# Patient Record
Sex: MALE | Race: BLACK OR AFRICAN AMERICAN | Employment: FULL TIME | URBAN - METROPOLITAN AREA
[De-identification: names, ages, dates, MRNs, and addresses within clinical notes are randomized per-mention and may not be internally consistent; named-entity substitution may affect disease eponyms.]

---

## 2017-10-26 ENCOUNTER — OFFICE VISIT (OUTPATIENT)
Dept: CARDIOLOGY CLINIC | Age: 37
End: 2017-10-26

## 2017-10-26 VITALS
HEIGHT: 72 IN | DIASTOLIC BLOOD PRESSURE: 80 MMHG | HEART RATE: 80 BPM | SYSTOLIC BLOOD PRESSURE: 118 MMHG | WEIGHT: 280 LBS | BODY MASS INDEX: 37.93 KG/M2 | OXYGEN SATURATION: 97 %

## 2017-10-26 DIAGNOSIS — I42.9 CARDIOMYOPATHY, UNSPECIFIED TYPE (HCC): Primary | ICD-10-CM

## 2017-10-26 DIAGNOSIS — R00.0 TACHYCARDIA: ICD-10-CM

## 2017-10-26 RX ORDER — METOPROLOL SUCCINATE 50 MG/1
TABLET, EXTENDED RELEASE ORAL DAILY
COMMUNITY
End: 2017-11-03 | Stop reason: ALTCHOICE

## 2017-10-26 RX ORDER — BISMUTH SUBSALICYLATE 262 MG
1 TABLET,CHEWABLE ORAL DAILY
COMMUNITY

## 2017-10-26 NOTE — PROGRESS NOTES
1. Have you been to the ER, urgent care clinic since your last visit? Hospitalized since your last visit? No     2. Have you seen or consulted any other health care providers outside of the [address] [place] [address] since your last visit? Include any pap smears or colon screening.   No

## 2017-10-26 NOTE — PROGRESS NOTES
History of Present Illness:  A 40 y.o. male referred by Dr. Adan Farr for palpitations with heart rate of 150 beats per minute as well as to establish care for a history of hypertrophic cardiomyopathy. He was diagnosed with hypertrophic cardiomyopathy many years ago and recently relocated from [place]. He is starting a Hinduism in the area. Over this last weekend, he noted his heart rate increasing to 150 beats per minute which persisted for a few hours after just tying his shoes. He has lost significant weight over the last few weeks and has been taking some supplements for weight loss/energy. It is unclear what is in the supplement, however. He denies syncope. He has had some occasional chest pain from time to time. No PND, orthopnea, edema. He has two children who are healthy and have been screened for hypertrophic cardiomyopathy and have been normal. He has a history of hyperlipidemia as well. No recent echocardiogram or event monitor./    Impression/Plan:   History of hypertrophic cardiomyopathy by report. Recent palpitations with tachycardia up to 150 beats per minute. Dyslipidemia. BMI 37. Recent aggressive weight loss with supplements. He is in sinus rhythm today and doing well. He does not have any syncope. I will obtain the results from his last cardiologist in [facility]. I have asked him to hold off on using any supplements at this point. I will also obtain an echocardiogram and 30-day event monitor and see him back. I discussed the multitude of arrhythmias that could be associated with hypertrophic cardiomyopathy but at this point I need to identify rhythm. If this is unrevealing, I may consider him for a subcutaneous loop recorder. All questions answered. Past Medical History:   Diagnosis Date    Hypertrophic cardiomyopathy (HCC)        Current Outpatient Prescriptions   Medication Sig Dispense Refill    metoprolol succinate (TOPROL XL) 50 mg XL tablet Take  by mouth daily.       multivitamin (ONE A DAY) tablet Take 1 Tab by mouth daily.  B.infantis-B.ani-B.long-B.bifi (PROBIOTIC 4X) 10-15 mg TbEC Take  by mouth. Social History   reports that he has never smoked. He has never used smokeless tobacco.   reports that he does not drink alcohol. Family History  family history is not on file. Review of Systems  Except as stated above include:  Constitutional: Negative for fever, chills and malaise/fatigue. HEENT: No congestion or recent URI. Gastrointestinal: No nausea, vomiting, abdominal pain, bloody stools. Pulmonary:  Negative except as stated above. Cardiac:  Negative except as stated above. Musculoskeletal: Negative except as stated above. Neurological:  No localized symptoms. Skin:  Negative except as stated above. Psych:  No mood swings. Endocrine:  No heat/cold intolerance. PHYSICAL EXAM  BP Readings from Last 3 Encounters:   10/26/17 118/80     Pulse Readings from Last 3 Encounters:   10/26/17 80     Wt Readings from Last 3 Encounters:   10/26/17 127 kg (280 lb)     General:   Well developed, well groomed. Head/Neck:   No jugular venous distention     No carotid bruits. No evidence of xanthelasma. Lungs:   No respiratory distress. Clear bilaterally. Heart:    Regular rate and rhythm. Normal S1/S2. Palpation of heart with normal point of maximum impulse. No significant murmurs, rubs or gallops. Abdomen:   Soft and nontender. No palpable abdominal mass or bruits. Extremities:   Intact peripheral pulses. No significant edema. Neurological:   Alert and oriented to person, place, time. No focal neurological deficit visually. Blood Pressure Metric:  Carol White has been given the following recommendations today due to his elevated BP reading: anti-hypertensive pharmacologic therapy ordered.

## 2017-10-26 NOTE — MR AVS SNAPSHOT
Visit Information Date & Time Provider Department Dept. Phone Encounter #  
 10/26/2017 11:00 AM Isai Messer MD [facility] [address] [id number] Follow-up Instructions Follow-up and Disposition History Upcoming Health Maintenance Date Due DTaP/Tdap/Td series (1 - Tdap) 2/26/2001 INFLUENZA AGE 9 TO ADULT 8/1/2017 Allergies as of 10/26/2017  Review Complete On: 10/26/2017 By: Isai Messer MD  
 Not on File Current Immunizations  Never Reviewed No immunizations on file. Not reviewed this visit You Were Diagnosed With   
  
 Codes Comments Cardiomyopathy, unspecified type ([facility]    -  Primary ICD-10-CM: I42.9 ICD-9-CM: 425.4 Tachycardia     ICD-10-CM: R00.0 ICD-9-CM: 474. 0 Vitals BP Pulse Height(growth percentile) Weight(growth percentile) SpO2 BMI  
 118/80 80 6' (1.829 m) 280 lb (127 kg) 97% 37.97 kg/m2 Smoking Status Never Smoker Vitals History BMI and BSA Data Body Mass Index Body Surface Area  
 37.97 kg/m 2 2.54 m 2 Your Updated Medication List  
  
   
This list is accurate as of: 10/26/17 12:10 PM.  Always use your most recent med list.  
  
  
  
  
 multivitamin tablet Commonly known as:  ONE A DAY Take 1 Tab by mouth daily. PROBIOTIC 4X 10-15 mg Tbec Generic drug:  B.infantis-B.ani-B.long-B.bifi Take  by mouth. TOPROL XL 50 mg XL tablet Generic drug:  metoprolol succinate Take  by mouth daily. We Performed the Following AMB POC EKG ROUTINE W/ 12 LEADS, INTER & REP [22292 CPT(R)] ECG EVENT RECORD MONITORING SET-UP [id number] CPT(R)] To-Do List   
 10/26/2017 ECHO:  2D ECHO COMPLETE ADULT (TTE) W OR WO CONTR   
  
 11/10/2017 3:30 PM  
  Appointment with HBV- IE33 MACHINE (WT ) at [facility] NON-INVASIVE CARD ([phone]) Age Limit for ALL Heart procedures @ [facility] facilities: 18 yrs and older only. Under the age of 25, refer to [facility] (365-2009). Wt Limit: 350lbs. This study requires patient to bring a written physician's order or MD office may fax the order to Central Scheduling at [phone]. Patient needs to bring a current list of all medications. No preparation is required for this study. Patients should report 15 minutes prior to their appointment time to the [facility], [address] [place] [address]/[address]. Introducing [facility] & HEALTH SERVICES! Ela Cai introduces [employer] patient portal. Now you can access parts of your medical record, email your doctor's office, and request medication refills online. 1. In your internet browser, go to [email or web address] 2. Click on the First Time User? Click Here link in the Sign In box. You will see the New Member Sign Up page. 3. Enter your [employer] Access Code exactly as it appears below. You will not need to use this code after youve completed the sign-up process. If you do not sign up before the expiration date, you must request a new code. · [employer] Access Code: [email or web address] Expires: 1/24/2018 12:03 PM 
 
4. Enter the last four digits of your Social Security Number (xxxx) and Date of Birth (mm/dd/yyyy) as indicated and click Submit. You will be taken to the next sign-up page. 5. Create a [employer] ID. This will be your [employer] login ID and cannot be changed, so think of one that is secure and easy to remember. 6. Create a [employer] password. You can change your password at any time. 7. Enter your Password Reset Question and Answer. This can be used at a later time if you forget your password. 8. Enter your e-mail address. You will receive e-mail notification when new information is available in [address] 9. Click Sign Up. You can now view and download portions of your medical record. 10. Click the Download Summary menu link to download a portable copy of your medical information. If you have questions, please visit the Frequently Asked Questions section of the [employer] website. Remember, [employer] is NOT to be used for urgent needs. For medical emergencies, dial 911. Now available from your iPhone and Android! Please provide this summary of care documentation to your next provider. Your primary care clinician is listed as ANDREE STALLINGS. If you have any questions after today's visit, please call [phone].

## 2017-11-03 RX ORDER — METOPROLOL TARTRATE 25 MG/1
12.5 TABLET, FILM COATED ORAL 2 TIMES DAILY
Qty: 30 TAB | Refills: 6 | Status: SHIPPED | OUTPATIENT
Start: 2017-11-03 | End: 2018-02-01 | Stop reason: ALTCHOICE

## 2017-11-03 NOTE — TELEPHONE ENCOUNTER
Report received from [employer] this morning from patient's event recorder showing an episode of afib with RVR -5 beat run of VT occurring yesterday at 6:01 pm. Spoke with patient. He states he is aware of palpitations at that time with associated dizziness. He was playing the piano. He states the dizziness lasted a few seconds. Patient states he had taken his Toprol XL 50 mg the night prior at 2100. He does feel that his palpitations are becoming a bit more frequent.  I will make Dr. Raquel Morin aware

## 2017-11-03 NOTE — TELEPHONE ENCOUNTER
Euel Romberg, MD   to Me           11/3/17 10:54 AM   Noted. Gladys Garcia sure he is not taking any supplements and start on lopressor 12.5 mg bid.  continue to wear monitor, thx     Patient states he has stop taking the thermafit supplement. He verbalized understanding.

## 2017-11-10 ENCOUNTER — HOSPITAL ENCOUNTER (OUTPATIENT)
Dept: NON INVASIVE DIAGNOSTICS | Age: 37
Discharge: HOME | End: 2017-11-10
Attending: INTERNAL MEDICINE
Payer: COMMERCIAL

## 2017-11-10 DIAGNOSIS — R00.0 TACHYCARDIA: ICD-10-CM

## 2017-11-10 DIAGNOSIS — I42.9 CARDIOMYOPATHY, UNSPECIFIED TYPE (HCC): ICD-10-CM

## 2017-11-10 PROCEDURE — 93306 TTE W/DOPPLER COMPLETE: CPT

## 2017-11-17 ENCOUNTER — TELEPHONE (OUTPATIENT)
Dept: CARDIOLOGY CLINIC | Age: 37
End: 2017-11-17

## 2017-11-17 NOTE — TELEPHONE ENCOUNTER
Patient called to follow up on a message left from our office. I read the message left in CC . He said ok and then asked if he needed to continue wearing the monitor. I checked the date of set up and notified him he still had time before his 30 days was up. The patient verbalized that he understood and the call was ended.   Arcadio Herrera

## 2018-02-01 ENCOUNTER — OFFICE VISIT (OUTPATIENT)
Dept: CARDIOLOGY CLINIC | Age: 38
End: 2018-02-01

## 2018-02-01 VITALS
DIASTOLIC BLOOD PRESSURE: 72 MMHG | HEART RATE: 66 BPM | BODY MASS INDEX: 36.98 KG/M2 | SYSTOLIC BLOOD PRESSURE: 140 MMHG | OXYGEN SATURATION: 97 % | HEIGHT: 72 IN | WEIGHT: 273 LBS

## 2018-02-01 DIAGNOSIS — I42.9 CARDIOMYOPATHY, UNSPECIFIED TYPE (HCC): ICD-10-CM

## 2018-02-01 DIAGNOSIS — I48.0 PAROXYSMAL ATRIAL FIBRILLATION (HCC): ICD-10-CM

## 2018-02-01 DIAGNOSIS — R00.0 TACHYCARDIA: Primary | ICD-10-CM

## 2018-02-01 RX ORDER — METOPROLOL SUCCINATE 50 MG/1
50 TABLET, EXTENDED RELEASE ORAL DAILY
Qty: 30 TAB | Refills: 6 | Status: SHIPPED | OUTPATIENT
Start: 2018-02-01 | End: 2018-12-13 | Stop reason: SDUPTHER

## 2018-02-01 RX ORDER — ASPIRIN 81 MG/1
TABLET ORAL DAILY
COMMUNITY

## 2018-02-01 NOTE — MR AVS SNAPSHOT
[address] [address] [address] [address] [phone] Patient: Cong Palma MRN: [id number] ETN:[id number] Visit Information Date & Time Provider Department Dept. Phone Encounter #  
 2/1/2018  4:20 PM Thong Ring MD [facility] [address] [id number] Upcoming Health Maintenance Date Due Pneumococcal 19-64 Medium Risk (1 of 1 - PPSV23) 2/26/1999 DTaP/Tdap/Td series (1 - Tdap) 2/26/2001 Influenza Age 5 to Adult 8/1/2017 Allergies as of 2/1/2018  Review Complete On: 2/1/2018 By: Thong Ring MD  
 Not on File Current Immunizations  Never Reviewed No immunizations on file. Not reviewed this visit You Were Diagnosed With   
  
 Codes Comments Tachycardia    -  Primary ICD-10-CM: R00.0 ICD-9-CM: 785.0 Cardiomyopathy, unspecified type ([facility]     ICD-10-CM: I42.9 ICD-9-CM: 425.4 Vitals BP Pulse Height(growth percentile) Weight(growth percentile) SpO2 BMI  
 140/72 66 6' (1.829 m) 273 lb (123.8 kg) 97% 37.03 kg/m2 Smoking Status Never Smoker Vitals History BMI and BSA Data Body Mass Index Body Surface Area  
 37.03 kg/m 2 2.51 m 2 Preferred Pharmacy Pharmacy Name Phone [address] [place] [phone] Your Updated Medication List  
  
   
This list is accurate as of: 2/1/18  4:57 PM.  Always use your most recent med list.  
  
  
  
  
 aspirin delayed-release 81 mg tablet Take  by mouth daily. multivitamin tablet Commonly known as:  ONE A DAY Take 1 Tab by mouth daily. PROBIOTIC 4X 10-15 mg Tbec Generic drug:  B.infantis-B.ani-B.long-B.bifi Take  by mouth. Introducing [facility] & HEALTH SERVICES! Dear Jacob Mcmullen: 
Thank you for requesting a [employer] account.   Our records indicate that you already have an active [employer] account. You can access your account anytime at [email or web address] Did you know that you can access your hospital and ER discharge instructions at any time in [employer]? You can also review all of your test results from your hospital stay or ER visit. Additional Information If you have questions, please visit the Frequently Asked Questions section of the [employer] website at [email or web address] Remember, [employer] is NOT to be used for urgent needs. For medical emergencies, dial 911. Now available from your iPhone and Android! Please provide this summary of care documentation to your next provider. Your primary care clinician is listed as ANDREE STALLINGS. If you have any questions after today's visit, please call [phone].

## 2018-02-01 NOTE — PROGRESS NOTES
1. Have you been to the ER, urgent care clinic since your last visit? Hospitalized since your last visit? No     2. Have you seen or consulted any other health care providers outside of the [address] [place] [address] since your last visit? Include any pap smears or colon screening.  No

## 2018-02-01 NOTE — PROGRESS NOTES
History of Present Illness:  A 40 y.o. male here for follow up. He was initially referred by Dr. Calvin Guthrie for palpitations. He has a history of hypertrophic cardiomyopathy and recently relocated from [place]. 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 Unfortunately, this fell through and once his lease is up in June, he will go back to [place]. He had a cardiologist there. He was using some supplements for supplements for weight loss and energy and it is unclear what was in the supplement. He has never had any syncope. He had some occasional chest pain from time to time which resolved. No dyspnea, PND, orthopnea, edema. He has two healthy children who have been screened for hypertrophic cardiomyopathy. He has a history of dyslipidemia. I ordered an echocardiogram and a 30-day event monitor and he is here to discuss the results.      Impression/Plan:   Hypertrophic cardiomyopathy with echocardiogram showing septal thickness at 1.8 cm and normal with hyperdynamic function. There was no significant outflow obstruction. Recent palpitations with event monitor showing paroxysmal atrial flutter as well as atrial fibrillation and increased ventricular rate. He is taking low dose Lopressor and had previously taken Toprol 50 mg daily and done quite well. Dyslipidemia. BMI 37. Recent aggressive weight loss with supplements. He is in sinus rhythm today. I discussed the findings on his event monitor showing paroxysmal atrial fibrillation and flutter. They are infrequent and he has never had any syncope. I recommended increasing Toprol to 50 mg a day and starting a baby aspirin. I discussed the increased risk for stroke. I did discuss potential treatment options such as atrial flutter ablation and atrial fibrillation ablation. However at this time, he will be going back to [place] later this year, and I will leave it to his cardiologist there.  I also discussed the 1.8 cm septal thickness for his hypertrophic cardiomyopathy and he has not had any syncope or nonsustained VT. At this point, continue to monitor but I did discuss the potential need for an AICD. All questions answered. If he is still in town in six months, I will see him back. Otherwise, he will follow up with his cardiologist in [facility]. I have given him the results for his echocardiogram as well as event monitor to review. Past Medical History:   Diagnosis Date    Hypertrophic cardiomyopathy (HCC)        Current Outpatient Prescriptions   Medication Sig Dispense Refill    metoprolol tartrate (LOPRESSOR) 25 mg tablet Take 0.5 Tabs by mouth two (2) times a day. 30 Tab 6    multivitamin (ONE A DAY) tablet Take 1 Tab by mouth daily.  B.infantis-B.ani-B.long-B.bifi (PROBIOTIC 4X) 10-15 mg TbEC Take  by mouth. Social History   reports that he has never smoked. He has never used smokeless tobacco.   reports that he does not drink alcohol. Family History  family history is not on file. Review of Systems  Except as stated above include:  Constitutional: Negative for fever, chills and malaise/fatigue. HEENT: No congestion or recent URI. Gastrointestinal: No nausea, vomiting, abdominal pain, bloody stools. Pulmonary:  Negative except as stated above. Cardiac:  Negative except as stated above. Musculoskeletal: Negative except as stated above. Neurological:  No localized symptoms. Skin:  Negative except as stated above. Psych:  No mood swings. Endocrine:  No heat/cold intolerance. PHYSICAL EXAM  BP Readings from Last 3 Encounters:   02/01/18 140/72   10/26/17 118/80     Pulse Readings from Last 3 Encounters:   02/01/18 66   10/26/17 80     Wt Readings from Last 3 Encounters:   02/01/18 123.8 kg (273 lb)   10/26/17 127 kg (280 lb)     General:   Well developed, well groomed. Head/Neck:   No jugular venous distention     No carotid bruits. No evidence of xanthelasma. Lungs:   No respiratory distress. Clear bilaterally.   Heart:    Regular rate and rhythm. Normal S1/S2. Palpation of heart with normal point of maximum impulse. No significant murmurs, rubs or gallops. Abdomen:   Soft and nontender. No palpable abdominal mass or bruits. Extremities:   Intact peripheral pulses. No significant edema. Neurological:   Alert and oriented to person, place, time. No focal neurological deficit visually. Blood Pressure Metric:  Marly Monique has been given the following recommendations today due to his elevated BP reading: anti-hypertensive pharmacologic therapy ordered.

## 2018-08-21 ENCOUNTER — OFFICE VISIT (OUTPATIENT)
Dept: CARDIOLOGY CLINIC | Age: 38
End: 2018-08-21
Payer: COMMERCIAL

## 2018-08-21 VITALS
HEART RATE: 74 BPM | SYSTOLIC BLOOD PRESSURE: 112 MMHG | BODY MASS INDEX: 37.82 KG/M2 | DIASTOLIC BLOOD PRESSURE: 60 MMHG | RESPIRATION RATE: 16 BRPM | HEIGHT: 72 IN | WEIGHT: 279.2 LBS

## 2018-08-21 DIAGNOSIS — R07.9 CHEST PAIN, UNSPECIFIED TYPE: Primary | ICD-10-CM

## 2018-08-21 DIAGNOSIS — E78.01 FAMILIAL HYPERCHOLESTEROLEMIA: ICD-10-CM

## 2018-08-21 PROCEDURE — 93000 ELECTROCARDIOGRAM COMPLETE: CPT | Performed by: INTERNAL MEDICINE

## 2018-08-21 PROCEDURE — 99214 OFFICE O/P EST MOD 30 MIN: CPT | Performed by: INTERNAL MEDICINE

## 2018-10-29 ENCOUNTER — OFFICE VISIT (OUTPATIENT)
Dept: CARDIOLOGY CLINIC | Age: 38
End: 2018-10-29
Payer: COMMERCIAL

## 2018-10-29 VITALS
RESPIRATION RATE: 16 BRPM | WEIGHT: 279 LBS | HEIGHT: 72 IN | SYSTOLIC BLOOD PRESSURE: 110 MMHG | HEART RATE: 71 BPM | BODY MASS INDEX: 37.79 KG/M2 | DIASTOLIC BLOOD PRESSURE: 70 MMHG

## 2018-10-29 DIAGNOSIS — E78.01 FAMILIAL HYPERCHOLESTEROLEMIA: ICD-10-CM

## 2018-10-29 DIAGNOSIS — R07.9 CHEST PAIN, UNSPECIFIED TYPE: Primary | ICD-10-CM

## 2018-10-29 PROCEDURE — 99214 OFFICE O/P EST MOD 30 MIN: CPT | Performed by: INTERNAL MEDICINE

## 2018-10-29 PROCEDURE — 93000 ELECTROCARDIOGRAM COMPLETE: CPT | Performed by: INTERNAL MEDICINE

## 2018-10-29 NOTE — PROGRESS NOTES
MRI due to body habitus. No malignant arrhythmias noted on stress. Stable Holter 9/12. Continue BB and ASA. No high risk features found at this juncture. No outflow tract obstruction, arrhythmia, syncope, known family history of sudden cardiac death and LV thickness is < 3cm. Familial screening of his children recommended. 2. HTN: Observe. 3. Familial hyperlipidemia: Mixed lipid issues. Severely elevated triglycerides and cholesterol. Continue statin/niacin and restart fish oil. Consider PCSK9 if economically feasible. 4. PAF: Cardiologist in [place] suggested finding of PAF. CHADS VASc 1. No recurrence. Observe. Burak Mann D.O.   Cardiologist  Cardiology, [facility]

## 2018-12-13 RX ORDER — METOPROLOL SUCCINATE 50 MG/1
TABLET, EXTENDED RELEASE ORAL
Qty: 30 TAB | Refills: 6 | Status: SHIPPED | OUTPATIENT
Start: 2018-12-13 | End: 2019-07-16 | Stop reason: SDUPTHER

## 2019-07-16 RX ORDER — METOPROLOL SUCCINATE 50 MG/1
TABLET, EXTENDED RELEASE ORAL
Qty: 90 TAB | Refills: 3 | Status: SHIPPED | OUTPATIENT
Start: 2019-07-16

## 2019-09-10 ENCOUNTER — OFFICE VISIT (OUTPATIENT)
Dept: CARDIOLOGY CLINIC | Age: 39
End: 2019-09-10
Payer: COMMERCIAL

## 2019-09-10 VITALS
HEART RATE: 66 BPM | RESPIRATION RATE: 16 BRPM | SYSTOLIC BLOOD PRESSURE: 124 MMHG | BODY MASS INDEX: 40.44 KG/M2 | HEIGHT: 72 IN | WEIGHT: 298.6 LBS | DIASTOLIC BLOOD PRESSURE: 78 MMHG

## 2019-09-10 DIAGNOSIS — I42.2 HYPERTROPHIC CARDIOMYOPATHY (HCC): Primary | ICD-10-CM

## 2019-09-10 PROCEDURE — 93000 ELECTROCARDIOGRAM COMPLETE: CPT | Performed by: INTERNAL MEDICINE

## 2019-09-10 PROCEDURE — 99214 OFFICE O/P EST MOD 30 MIN: CPT | Performed by: INTERNAL MEDICINE

## 2019-09-10 RX ORDER — AZITHROMYCIN 250 MG/1
TABLET, FILM COATED ORAL
Refills: 0 | COMMUNITY
Start: 2019-09-08 | End: 2020-05-04

## 2019-09-10 RX ORDER — BENZONATATE 100 MG/1
CAPSULE ORAL
Refills: 0 | COMMUNITY
Start: 2019-09-08 | End: 2020-05-04

## 2019-09-10 RX ORDER — CEFDINIR 300 MG/1
CAPSULE ORAL
Refills: 0 | COMMUNITY
Start: 2019-09-08 | End: 2020-05-04

## 2020-02-25 RX ORDER — METOPROLOL SUCCINATE 50 MG/1
50 TABLET, EXTENDED RELEASE ORAL DAILY
Qty: 90 TABLET | Refills: 3 | Status: SHIPPED
Start: 2020-02-25 | End: 2020-07-31 | Stop reason: SDUPTHER

## 2020-04-28 ENCOUNTER — TELEPHONE (OUTPATIENT)
Dept: CARDIOLOGY CLINIC | Age: 40
End: 2020-04-28

## 2020-04-28 NOTE — TELEPHONE ENCOUNTER
Pt was calling for an annual appt with Dr Holli Angel. Pt stated that he has been experiencing more frequent episodes of his heart racing, episodes do not last more than a minute or so. He was  questioning if his metoprolol succinate 50 me daily might need adjusted. Pt would be willing to do a VV phone number was verified.  Please review and call pt with recommendations [phone]

## 2020-05-04 ENCOUNTER — VIRTUAL VISIT (OUTPATIENT)
Dept: CARDIOLOGY CLINIC | Age: 40
End: 2020-05-04
Payer: COMMERCIAL

## 2020-05-04 VITALS
HEART RATE: 62 BPM | BODY MASS INDEX: 41.58 KG/M2 | HEIGHT: 72 IN | DIASTOLIC BLOOD PRESSURE: 79 MMHG | WEIGHT: 307 LBS | SYSTOLIC BLOOD PRESSURE: 124 MMHG

## 2020-05-04 PROCEDURE — 99214 OFFICE O/P EST MOD 30 MIN: CPT | Performed by: INTERNAL MEDICINE

## 2020-05-04 RX ORDER — ATORVASTATIN CALCIUM 40 MG/1
40 TABLET, FILM COATED ORAL DAILY
COMMUNITY
Start: 2020-04-24

## 2020-05-04 NOTE — PROGRESS NOTES
CHIEF COMPLAINT: Palpitation, Chest pain and apical variant Hypertrophic Cardiomyopathy. HPI: Patient is a 36 y.o. male seen at the request of Reagan Torres MD.      Patient seen in follow up for a apical variant hypertrophic cardiomyopathy. No family history of SCD or HCM. Patient underwent cath that was stable 8/12. No CP or  SOB. Increased palpitations. Past Medical History:   Diagnosis Date    Abnormal EKG     Abnormal stress ECG     Chest pain     GRIJALVA (dyspnea on exertion)     Hyperlipidemia     Hypertension     LVH (left ventricular hypertrophy)        Patient Active Problem List   Diagnosis    Hyperlipidemia    LVH (left ventricular hypertrophy)    Abnormal EKG    Abnormal stress ECG    Chest pain    GRIJALVA (dyspnea on exertion)    Obesity    Hypertrophic cardiomyopathy (HCC)    Elevated LFTs       No Known Allergies    Current Outpatient Medications   Medication Sig Dispense Refill    atorvastatin (LIPITOR) 40 MG tablet       metoprolol succinate (TOPROL XL) 50 MG extended release tablet Take 1 tablet by mouth daily 90 tablet 3     No current facility-administered medications for this visit.         Social History     Socioeconomic History    Marital status:      Spouse name: Not on file    Number of children: Not on file    Years of education: Not on file    Highest education level: Not on file   Occupational History    Not on file   Social Needs    Financial resource strain: Not on file    Food insecurity     Worry: Not on file     Inability: Not on file    Transportation needs     Medical: Not on file     Non-medical: Not on file   Tobacco Use    Smoking status: Never Smoker    Smokeless tobacco: Never Used   Substance and Sexual Activity    Alcohol use: No    Drug use: No    Sexual activity: Not on file   Lifestyle    Physical activity     Days per week: Not on file     Minutes per session: Not on file    Stress: Not on file   Relationships    3 cm.    Familial screening of his children recommended. 2. HTN: Observe. 3. Familial hyperlipidemia: Consider restarting statin and fish oil. 4. PAF/Palpitations: Cardiologist in [place] suggested finding of PAF. CHADS VASc 1. No recurrence. Observe. Vilma De Jesus D.O. Cardiologist  Cardiology, [facility]) Physicians    Patient is being evaluated by a Virtual Visit (video visit) encounter to address concerns as mentioned above. A caregiver was present when appropriate. Due to this being a TeleHealth encounter (During FXM-54 public health emergency), evaluation of the following organ systems was limited: Vitals/Constitutional/EENT/Resp/CV/GI//MS/Neuro/Skin/Heme-Lymph-Imm. Pursuant to the emergency declaration under the [address] [place] [address], [address] [place] [address] authority and the [employer] and [employer] Act, this Virtual Visit was conducted with patient's (and/or legal guardian's) consent, to reduce the patient's risk of exposure to COVID-19 and provide necessary medical care. The patient (and/or legal guardian) has also been advised to contact this office for worsening conditions or problems, and seek emergency medical treatment and/or call 911 if deemed necessary. Services were provided through a video synchronous discussion virtually to substitute for in-person clinic visit. Patient and provider were located at their individual homes.

## 2020-07-15 ENCOUNTER — TELEPHONE (OUTPATIENT)
Dept: URGENT CARE | Facility: CLINIC | Age: 40
End: 2020-07-15

## 2020-07-31 RX ORDER — METOPROLOL SUCCINATE 50 MG/1
50 TABLET, EXTENDED RELEASE ORAL DAILY
Qty: 90 TABLET | Refills: 3 | Status: SHIPPED
Start: 2020-07-31 | End: 2020-11-02

## 2020-10-21 ENCOUNTER — NURSE ONLY (OUTPATIENT)
Dept: CARDIOLOGY CLINIC | Age: 40
End: 2020-10-21

## 2020-10-21 NOTE — PROGRESS NOTES
Patient was in today for 48 hr holter monitor per Dr. Christine Elliott. Patient was given instructions and questions answered, verbalize understanding.        Nancy Renteria MA

## 2020-10-29 ENCOUNTER — TELEPHONE (OUTPATIENT)
Dept: CARDIOLOGY CLINIC | Age: 40
End: 2020-10-29

## 2020-10-29 NOTE — TELEPHONE ENCOUNTER
From   Hilario Womack      Have you had a chance to look over my heart monitor readings? I feel like we might need to upgrade the dosage amount. I also noticed an increase when I ate chocolate. Lately, I have fell a muscle soreness near my left pectoral muscles and occasionally it switches over to the right pectoral muscles.

## 2020-11-02 ENCOUNTER — OFFICE VISIT (OUTPATIENT)
Dept: CARDIOLOGY CLINIC | Age: 40
End: 2020-11-02
Payer: COMMERCIAL

## 2020-11-02 ENCOUNTER — TELEPHONE (OUTPATIENT)
Dept: CARDIOLOGY CLINIC | Age: 40
End: 2020-11-02

## 2020-11-02 VITALS
WEIGHT: 310 LBS | DIASTOLIC BLOOD PRESSURE: 82 MMHG | RESPIRATION RATE: 16 BRPM | SYSTOLIC BLOOD PRESSURE: 122 MMHG | HEART RATE: 60 BPM | HEIGHT: 72 IN | BODY MASS INDEX: 41.99 KG/M2

## 2020-11-02 DIAGNOSIS — R00.2 PALPITATIONS: ICD-10-CM

## 2020-11-02 DIAGNOSIS — I48.0 PAF (PAROXYSMAL ATRIAL FIBRILLATION) (HCC): ICD-10-CM

## 2020-11-02 LAB
ALBUMIN SERPL-MCNC: 4.5 G/DL (ref 3.5–5.2)
ALP BLD-CCNC: 112 U/L (ref 40–129)
ALT SERPL-CCNC: 58 U/L (ref 0–40)
ANION GAP SERPL CALCULATED.3IONS-SCNC: 13 MMOL/L (ref 7–16)
AST SERPL-CCNC: 35 U/L (ref 0–39)
BILIRUB SERPL-MCNC: 0.4 MG/DL (ref 0–1.2)
BUN BLDV-MCNC: 15 MG/DL (ref 6–20)
CALCIUM SERPL-MCNC: 10.4 MG/DL (ref 8.6–10.2)
CHLORIDE BLD-SCNC: 103 MMOL/L (ref 98–107)
CO2: 26 MMOL/L (ref 22–29)
CREAT SERPL-MCNC: 0.9 MG/DL (ref 0.7–1.2)
GFR AFRICAN AMERICAN: >60
GFR NON-AFRICAN AMERICAN: >60 ML/MIN/1.73
GLUCOSE BLD-MCNC: 147 MG/DL (ref 74–99)
POTASSIUM SERPL-SCNC: 4.4 MMOL/L (ref 3.5–5)
SODIUM BLD-SCNC: 142 MMOL/L (ref 132–146)
TOTAL PROTEIN: 7.9 G/DL (ref 6.4–8.3)
TSH SERPL DL<=0.05 MIU/L-ACNC: 2.36 UIU/ML (ref 0.27–4.2)

## 2020-11-02 PROCEDURE — 93000 ELECTROCARDIOGRAM COMPLETE: CPT | Performed by: INTERNAL MEDICINE

## 2020-11-02 PROCEDURE — 99214 OFFICE O/P EST MOD 30 MIN: CPT | Performed by: INTERNAL MEDICINE

## 2020-11-02 RX ORDER — METOPROLOL SUCCINATE 50 MG/1
50 TABLET, EXTENDED RELEASE ORAL 2 TIMES DAILY
Qty: 180 TABLET | Refills: 3 | Status: SHIPPED
Start: 2020-11-02 | End: 2020-11-11 | Stop reason: ALTCHOICE

## 2020-11-02 RX ORDER — METOPROLOL SUCCINATE 50 MG/1
75 TABLET, EXTENDED RELEASE ORAL DAILY
Qty: 135 TABLET | Refills: 3 | Status: SHIPPED
Start: 2020-11-02 | End: 2020-11-02

## 2020-11-02 RX ORDER — ASPIRIN 81 MG/1
81 TABLET ORAL DAILY
Qty: 90 TABLET | Refills: 1 | Status: SHIPPED
Start: 2020-11-02 | End: 2020-11-02

## 2020-11-02 NOTE — PROGRESS NOTES
CHIEF COMPLAINT: Palpitations/PAF/Apical variant Hypertrophic Cardiomyopathy. HPI: Patient is a 36 y.o. male seen at the request of Shiloh Rendon MD.      Patient seen in follow up for a apical variant hypertrophic cardiomyopathy. He denies syncope, near-syncope or palpitations. No family history of SCD or HCM. Patient underwent cath that was stable 8/12. Moved to [address] [facility] and now has moved back. Frequent palpitations for last month. No CP or SOB. Past Medical History:   Diagnosis Date    Abnormal EKG     Abnormal stress ECG     Chest pain     GRIJALVA (dyspnea on exertion)     Hyperlipidemia     Hypertension     LVH (left ventricular hypertrophy)        Patient Active Problem List   Diagnosis    Hyperlipidemia    LVH (left ventricular hypertrophy)    Abnormal EKG    Abnormal stress ECG    Chest pain    GRIJALVA (dyspnea on exertion)    Obesity    Hypertrophic cardiomyopathy (HCC)    Elevated LFTs       No Known Allergies    Current Outpatient Medications   Medication Sig Dispense Refill    metoprolol succinate (TOPROL XL) 50 MG extended release tablet Take 1 tablet by mouth 2 times daily 180 tablet 3    rivaroxaban (XARELTO) 20 MG TABS tablet Take 1 tablet by mouth daily (with breakfast) 30 tablet 5    atorvastatin (LIPITOR) 40 MG tablet Take 40 mg by mouth daily        No current facility-administered medications for this visit.         Social History     Socioeconomic History    Marital status:      Spouse name: Not on file    Number of children: Not on file    Years of education: Not on file    Highest education level: Not on file   Occupational History    Not on file   Social Needs    Financial resource strain: Not on file    Food insecurity     Worry: Not on file     Inability: Not on file    Transportation needs     Medical: Not on file     Non-medical: Not on file   Tobacco Use    Smoking status: Never Smoker    Smokeless tobacco: Never Used   Substance and Sexual Activity    Alcohol use: No    Drug use: No    Sexual activity: Not on file   Lifestyle    Physical activity     Days per week: Not on file     Minutes per session: Not on file    Stress: Not on file   Relationships    Social connections     Talks on phone: Not on file     Gets together: Not on file     Attends Protestant service: Not on file     Active member of club or organization: Not on file     Attends meetings of clubs or organizations: Not on file     Relationship status: Not on file    Intimate partner violence     Fear of current or ex partner: Not on file     Emotionally abused: Not on file     Physically abused: Not on file     Forced sexual activity: Not on file   Other Topics Concern    Not on file   Social History Narrative    Not on file       Family History   Family history unknown: Yes     Review of Systems:  Heart: as above   Lungs: as above   Eyes: denies changes in vision or discharge. Ears: denies changes in hearing or pain. Nose: denies epistaxis or masses   Throat: denies sore throat or trouble swallowing. Neuro: denies numbness, tingling, tremors. Skin: denies rashes or itching. : denies hematuria, dysuria   GI: denies vomiting, diarrhea   Psych: denies mood changed, anxiety, depression. all others negative. Physical Exam   /82   Pulse 60   Resp 16   Ht 6' (1.829 m)   Wt (!) 310 lb (140.6 kg)   BMI 42.04 kg/m²   Constitutional: Oriented to person, place, and time. Well-developed and well-nourished. No distress. Head: Normocephalic and atraumatic. Eyes: EOM are normal. Pupils are equal, round, and reactive to light. Neck: Normal range of motion. Neck supple. No hepatojugular reflux and no JVD present. Carotid bruit is not present. No tracheal deviation present. No thyromegaly present. Cardiovascular: Normal rate, regular rhythm, normal heart sounds and intact distal pulses. Exam reveals no gallop and no friction rub.   No murmur heard.  Pulmonary/Chest: Effort normal and breath sounds normal. No respiratory distress. No wheezes. No rales. No tenderness. Abdominal: Soft. Bowel sounds are normal. No distension and no mass. No tenderness. No rebound and no guarding. Musculoskeletal: Normal range of motion. No edema and no tenderness. Lymphadenopathy:   No cervical adenopathy. Neurological: Alert and oriented to person, place, and time. Skin: Skin is warm and dry. No rash noted. Not diaphoretic. No erythema. Psychiatric: Normal mood and affect. Behavior is normal.     EKG:  normal sinus rhythm, nonspecific ST and T waves changes, LVH with significant strain pattern. ASSESSMENT AND PLAN:  Patient Active Problem List   Diagnosis    Hyperlipidemia    LVH (left ventricular hypertrophy)    Abnormal EKG    Abnormal stress ECG    Chest pain    GRIJALVA (dyspnea on exertion)    Obesity    Hypertrophic cardiomyopathy (HCC)    Elevated LFTs     1. GRIJALVA:    Findings by contrast echo suggesting apical variant hypertrophic cardiomyopathy. Stress with reversible perfusion defect. Cath normal 8/12. Unable to do cardiac MRI due to body habitus. No malignant arrhythmias noted on stress. Stable Holter 9/12. Continue BB and ASA. No high risk features found at this juncture. No outflow tract obstruction, arrhythmia, syncope, known family history of sudden cardiac death and LV thickness is < 3 cm. Familial screening of his children recommended. 2. HTN: Observe. 3. Familial hyperlipidemia: Consider restarting statin and fish oil. 4. Palpitations/PAF:     Cardiologist in [place] suggested finding of PAF. Recurrent. Echo. CMP/TSH. Increase toprol. CHADS VASc 1. NOAC for now. Cm Cline D.O.   Cardiologist  Cardiology, [facility]

## 2020-11-02 NOTE — TELEPHONE ENCOUNTER
----- Message from Donita Stevens DO sent at 11/2/2020  8:51 AM EST -----  Can we please request sleep study report from [facility]?     TY

## 2020-11-05 ENCOUNTER — TELEPHONE (OUTPATIENT)
Dept: CARDIOLOGY CLINIC | Age: 40
End: 2020-11-05

## 2020-11-10 ENCOUNTER — TELEPHONE (OUTPATIENT)
Dept: CARDIOLOGY CLINIC | Age: 40
End: 2020-11-10

## 2020-11-11 RX ORDER — METOPROLOL TARTRATE 50 MG/1
50 TABLET, FILM COATED ORAL 2 TIMES DAILY
COMMUNITY
End: 2020-11-11 | Stop reason: SDUPTHER

## 2020-11-12 RX ORDER — METOPROLOL TARTRATE 50 MG/1
50 TABLET, FILM COATED ORAL 2 TIMES DAILY
Qty: 60 TABLET | Refills: 5 | Status: SHIPPED
Start: 2020-11-12 | End: 2020-12-09

## 2020-11-18 ENCOUNTER — TELEPHONE (OUTPATIENT)
Dept: CARDIOLOGY CLINIC | Age: 40
End: 2020-11-18

## 2020-11-18 NOTE — TELEPHONE ENCOUNTER
Pt. Called last week stating that he would like to switch to metoprolol tartrate because it is fast acting, per  a script was sent in for 50mg BID. Pt. Called today stating that he has been taking TID because if he doesn't he gets palpitations at night and taking it twice a day doesn't cover him all day, pt.  Is scheduled for his echo on 11/23 and would like to know if ok to take TID, please advise

## 2020-11-18 NOTE — TELEPHONE ENCOUNTER
Okay to take tid. Please fix script if not done. Bakari Ann D.O.   Cardiologist  Cardiology, [facility]

## 2020-11-20 ENCOUNTER — TELEPHONE (OUTPATIENT)
Dept: CARDIOLOGY | Age: 40
End: 2020-11-20

## 2020-11-23 ENCOUNTER — HOSPITAL ENCOUNTER (OUTPATIENT)
Dept: CARDIOLOGY | Age: 40
Discharge: HOME | End: 2020-11-23
Payer: COMMERCIAL

## 2020-11-23 ENCOUNTER — TELEPHONE (OUTPATIENT)
Dept: CARDIOLOGY | Age: 40
End: 2020-11-23

## 2020-11-23 LAB
LV EF: 60 %
LVEF MODALITY: NORMAL

## 2020-11-23 PROCEDURE — 93306 TTE W/DOPPLER COMPLETE: CPT | Performed by: PSYCHIATRY & NEUROLOGY

## 2020-11-23 PROCEDURE — 6360000004 HC RX CONTRAST MEDICATION: Performed by: INTERNAL MEDICINE

## 2020-11-23 PROCEDURE — 2580000003 HC RX 258: Performed by: INTERNAL MEDICINE

## 2020-11-23 RX ORDER — SODIUM CHLORIDE 0.9 % (FLUSH) 0.9 %
10 SYRINGE (ML) INJECTION PRN
Status: DISCONTINUED | OUTPATIENT
Start: 2020-11-23 | End: 2020-11-24 | Stop reason: HOSPADM

## 2020-11-23 RX ADMIN — SODIUM CHLORIDE, PRESERVATIVE FREE 10 ML: 5 INJECTION INTRAVENOUS at 09:58

## 2020-11-23 RX ADMIN — PERFLUTREN 1.1 MG: 6.52 INJECTION, SUSPENSION INTRAVENOUS at 09:58

## 2020-11-23 RX ADMIN — SODIUM CHLORIDE, PRESERVATIVE FREE 10 ML: 5 INJECTION INTRAVENOUS at 10:04

## 2020-11-30 ENCOUNTER — TELEPHONE (OUTPATIENT)
Dept: CARDIOLOGY CLINIC | Age: 40
End: 2020-11-30

## 2020-11-30 NOTE — TELEPHONE ENCOUNTER
----- Message from Tram Mckeon DO sent at 11/30/2020  9:32 AM EST -----  No changes in echo since last.

## 2020-12-09 ENCOUNTER — OFFICE VISIT (OUTPATIENT)
Dept: CARDIOLOGY CLINIC | Age: 40
End: 2020-12-09
Payer: COMMERCIAL

## 2020-12-09 VITALS
BODY MASS INDEX: 42.66 KG/M2 | HEIGHT: 72 IN | SYSTOLIC BLOOD PRESSURE: 108 MMHG | RESPIRATION RATE: 18 BRPM | DIASTOLIC BLOOD PRESSURE: 62 MMHG | HEART RATE: 69 BPM | WEIGHT: 315 LBS

## 2020-12-09 PROCEDURE — 93000 ELECTROCARDIOGRAM COMPLETE: CPT | Performed by: INTERNAL MEDICINE

## 2020-12-09 PROCEDURE — 99214 OFFICE O/P EST MOD 30 MIN: CPT | Performed by: INTERNAL MEDICINE

## 2020-12-09 RX ORDER — METOPROLOL TARTRATE 50 MG/1
50 TABLET, FILM COATED ORAL 3 TIMES DAILY
Qty: 270 TABLET | Refills: 3 | Status: SHIPPED
Start: 2020-12-09 | End: 2022-01-28 | Stop reason: SDUPTHER

## 2020-12-09 NOTE — PROGRESS NOTES
CHIEF COMPLAINT: Palpitations/PAF/Apical variant Hypertrophic Cardiomyopathy. HPI: Patient is a 36 y.o. male seen at the request of Trenton Lima MD.      Patient seen in follow up for a apical variant hypertrophic cardiomyopathy. He denies syncope, near-syncope or palpitations. No family history of SCD or HCM. Patient underwent cath that was stable 8/12. Moved to [facility] and now has moved back. No CP or SOB. Some palpitations. Past Medical History:   Diagnosis Date    Abnormal EKG     Abnormal stress ECG     Chest pain     GRIJALVA (dyspnea on exertion)     Hyperlipidemia     Hypertension     LVH (left ventricular hypertrophy)        Patient Active Problem List   Diagnosis    Hyperlipidemia    LVH (left ventricular hypertrophy)    Abnormal EKG    Abnormal stress ECG    Chest pain    GRIJALVA (dyspnea on exertion)    Obesity    Hypertrophic cardiomyopathy (HCC)    Elevated LFTs       No Known Allergies    Current Outpatient Medications   Medication Sig Dispense Refill    rivaroxaban (XARELTO) 20 MG TABS tablet Take 1 tablet by mouth daily (with breakfast) 30 tablet 5    metoprolol tartrate (LOPRESSOR) 50 MG tablet Take 1 tablet by mouth 2 times daily (Patient taking differently: Take 50 mg by mouth 3 times daily ) 60 tablet 5    atorvastatin (LIPITOR) 40 MG tablet Take 40 mg by mouth daily        No current facility-administered medications for this visit.         Social History     Socioeconomic History    Marital status:      Spouse name: Not on file    Number of children: Not on file    Years of education: Not on file    Highest education level: Not on file   Occupational History    Not on file   Social Needs    Financial resource strain: Not on file    Food insecurity     Worry: Not on file     Inability: Not on file    Transportation needs     Medical: Not on file     Non-medical: Not on file   Tobacco Use    Smoking status: Never Smoker    Smokeless tobacco: Never Used   Substance and Sexual Activity    Alcohol use: No    Drug use: No    Sexual activity: Not on file   Lifestyle    Physical activity     Days per week: Not on file     Minutes per session: Not on file    Stress: Not on file   Relationships    Social connections     Talks on phone: Not on file     Gets together: Not on file     Attends Restorationism service: Not on file     Active member of club or organization: Not on file     Attends meetings of clubs or organizations: Not on file     Relationship status: Not on file    Intimate partner violence     Fear of current or ex partner: Not on file     Emotionally abused: Not on file     Physically abused: Not on file     Forced sexual activity: Not on file   Other Topics Concern    Not on file   Social History Narrative    Not on file       Family History   Family history unknown: Yes     Review of Systems:  Heart: as above   Lungs: as above   Eyes: denies changes in vision or discharge. Ears: denies changes in hearing or pain. Nose: denies epistaxis or masses   Throat: denies sore throat or trouble swallowing. Neuro: denies numbness, tingling, tremors. Skin: denies rashes or itching. : denies hematuria, dysuria   GI: denies vomiting, diarrhea   Psych: denies mood changed, anxiety, depression. all others negative. Physical Exam   /62   Pulse 69   Resp 18   Ht 6' (1.829 m)   Wt (!) 318 lb 9.6 oz (144.5 kg)   BMI 43.21 kg/m²   Constitutional: Oriented to person, place, and time. Well-developed and well-nourished. No distress. Head: Normocephalic and atraumatic. Eyes: EOM are normal. Pupils are equal, round, and reactive to light. Neck: Normal range of motion. Neck supple. No hepatojugular reflux and no JVD present. Carotid bruit is not present. No tracheal deviation present. No thyromegaly present. Cardiovascular: Normal rate, regular rhythm, normal heart sounds and intact distal pulses.   Exam reveals no gallop and no friction rub.  No murmur heard. Pulmonary/Chest: Effort normal and breath sounds normal. No respiratory distress. No wheezes. No rales. No tenderness. Abdominal: Soft. Bowel sounds are normal. No distension and no mass. No tenderness. No rebound and no guarding. Musculoskeletal: Normal range of motion. No edema and no tenderness. Lymphadenopathy:   No cervical adenopathy. Neurological: Alert and oriented to person, place, and time. Skin: Skin is warm and dry. No rash noted. Not diaphoretic. No erythema. Psychiatric: Normal mood and affect. Behavior is normal.     EKG:  normal sinus rhythm, nonspecific ST and T waves changes, LVH with significant strain pattern. ASSESSMENT AND PLAN:  Patient Active Problem List   Diagnosis    Hyperlipidemia    LVH (left ventricular hypertrophy)    Abnormal EKG    Abnormal stress ECG    Chest pain    GRIJALVA (dyspnea on exertion)    Obesity    Hypertrophic cardiomyopathy (HCC)    Elevated LFTs     1. GRIJALVA:    Findings by contrast echo suggesting apical variant hypertrophic cardiomyopathy. Stress with reversible perfusion defect. Cath normal 8/12. Unable to do cardiac MRI due to body habitus. No malignant arrhythmias noted on stress. Stable Holter 9/12. Continue BB and ASA. No high risk features found at this juncture. No outflow tract obstruction, arrhythmia, syncope, known family history of sudden cardiac death and LV thickness is < 3 cm. Familial screening of his children recommended. 2. HTN: Observe. 3. Familial hyperlipidemia: Consider restarting statin and fish oil. 4. Palpitations/PAF:     Cardiologist in [address] [facility] suggested finding of PAF. Recurrent. Echo unchanged from prior. CMP/TSH. Continue toprol. CHADS VASc 1. NOAC for now. Claude Saldana D.O.   Cardiologist  Cardiology, [facility]

## 2021-05-10 RX ORDER — ASPIRIN 81 MG/1
TABLET, COATED ORAL
Qty: 90 TABLET | Refills: 1 | Status: SHIPPED | OUTPATIENT
Start: 2021-05-10

## 2021-06-02 ENCOUNTER — HOSPITAL ENCOUNTER (OUTPATIENT)
Age: 41
Discharge: HOME | End: 2021-06-02
Payer: COMMERCIAL

## 2021-06-02 LAB
ALBUMIN SERPL-MCNC: 4.3 G/DL (ref 3.5–5.2)
ALP BLD-CCNC: 104 U/L (ref 40–129)
ALT SERPL-CCNC: 62 U/L (ref 0–40)
ANION GAP SERPL CALCULATED.3IONS-SCNC: 6 MMOL/L (ref 7–16)
AST SERPL-CCNC: 63 U/L (ref 0–39)
BILIRUB SERPL-MCNC: 0.4 MG/DL (ref 0–1.2)
BUN BLDV-MCNC: 14 MG/DL (ref 6–20)
CALCIUM SERPL-MCNC: 9.5 MG/DL (ref 8.6–10.2)
CHLORIDE BLD-SCNC: 99 MMOL/L (ref 98–107)
CHOLESTEROL, TOTAL: 381 MG/DL (ref 0–199)
CO2: 30 MMOL/L (ref 22–29)
CREAT SERPL-MCNC: 0.9 MG/DL (ref 0.7–1.2)
GFR AFRICAN AMERICAN: >60
GFR NON-AFRICAN AMERICAN: >60 ML/MIN/1.73
GLUCOSE BLD-MCNC: 163 MG/DL (ref 74–99)
HDLC SERPL-MCNC: 36 MG/DL
LDL CHOLESTEROL CALCULATED: ABNORMAL MG/DL (ref 0–99)
POTASSIUM SERPL-SCNC: 4.8 MMOL/L (ref 3.5–5)
SARS-COV-2 ANTIBODY, TOTAL: NORMAL
SODIUM BLD-SCNC: 135 MMOL/L (ref 132–146)
TOTAL PROTEIN: 7.8 G/DL (ref 6.4–8.3)
TRIGL SERPL-MCNC: 478 MG/DL (ref 0–149)
VLDLC SERPL CALC-MCNC: ABNORMAL MG/DL

## 2021-06-02 PROCEDURE — 84153 ASSAY OF PSA TOTAL: CPT

## 2021-06-02 PROCEDURE — 80061 LIPID PANEL: CPT

## 2021-06-02 PROCEDURE — 80053 COMPREHEN METABOLIC PANEL: CPT

## 2021-06-02 PROCEDURE — 86769 SARS-COV-2 COVID-19 ANTIBODY: CPT

## 2021-06-02 PROCEDURE — 36415 COLL VENOUS BLD VENIPUNCTURE: CPT

## 2021-06-04 ENCOUNTER — HOSPITAL ENCOUNTER (OUTPATIENT)
Age: 41
Discharge: HOME | End: 2021-06-04
Payer: COMMERCIAL

## 2021-06-04 LAB — HBA1C MFR BLD: 6.8 % (ref 4–5.6)

## 2021-06-04 PROCEDURE — 36415 COLL VENOUS BLD VENIPUNCTURE: CPT

## 2021-06-04 PROCEDURE — 83036 HEMOGLOBIN GLYCOSYLATED A1C: CPT

## 2021-06-06 LAB
Lab: NORMAL
REPORT: NORMAL
THIS TEST SENT TO: NORMAL

## 2022-01-28 ENCOUNTER — TELEPHONE (OUTPATIENT)
Dept: CARDIOLOGY CLINIC | Age: 42
End: 2022-01-28

## 2022-01-28 RX ORDER — METOPROLOL TARTRATE 50 MG/1
50 TABLET, FILM COATED ORAL 3 TIMES DAILY
Qty: 270 TABLET | Refills: 0 | Status: SHIPPED
Start: 2022-01-28 | End: 2022-07-11

## 2022-01-28 NOTE — TELEPHONE ENCOUNTER
Pt called to sched overdue ov (06/09/2021) w/ KTM. Please return call for sched once appts open. Thank you.

## 2022-03-07 ENCOUNTER — OFFICE VISIT (OUTPATIENT)
Dept: CARDIOLOGY CLINIC | Age: 42
End: 2022-03-07
Payer: COMMERCIAL

## 2022-03-07 VITALS
WEIGHT: 315 LBS | HEART RATE: 60 BPM | SYSTOLIC BLOOD PRESSURE: 118 MMHG | BODY MASS INDEX: 42.66 KG/M2 | HEIGHT: 72 IN | RESPIRATION RATE: 18 BRPM | DIASTOLIC BLOOD PRESSURE: 88 MMHG

## 2022-03-07 DIAGNOSIS — I42.2 HYPERTROPHIC CARDIOMYOPATHY (HCC): Primary | ICD-10-CM

## 2022-03-07 DIAGNOSIS — E78.2 MIXED HYPERLIPIDEMIA: ICD-10-CM

## 2022-03-07 PROCEDURE — 93000 ELECTROCARDIOGRAM COMPLETE: CPT | Performed by: INTERNAL MEDICINE

## 2022-03-07 PROCEDURE — 99214 OFFICE O/P EST MOD 30 MIN: CPT | Performed by: INTERNAL MEDICINE

## 2022-03-07 NOTE — PROGRESS NOTES
CHIEF COMPLAINT: Palpitations/PAF/Apical variant Hypertrophic Cardiomyopathy. HPI: Patient is a 43 y.o. male seen at the request of Alyssa Murdock MD.      Patient seen in follow up for a apical variant hypertrophic cardiomyopathy. He denies syncope, near-syncope or palpitations. No family history of SCD or HCM. Patient underwent cath that was stable 8/12. No CP or SOB. Some palpitations. Past Medical History:   Diagnosis Date    Abnormal EKG     Abnormal stress ECG     Chest pain     GRIJALVA (dyspnea on exertion)     Hyperlipidemia     Hypertension     LVH (left ventricular hypertrophy)        Patient Active Problem List   Diagnosis    Hyperlipidemia    LVH (left ventricular hypertrophy)    Abnormal EKG    Abnormal stress ECG    Chest pain    GRIJALVA (dyspnea on exertion)    Obesity    Hypertrophic cardiomyopathy (HCC)    Elevated LFTs       No Known Allergies    Current Outpatient Medications   Medication Sig Dispense Refill    metoprolol tartrate (LOPRESSOR) 50 MG tablet Take 1 tablet by mouth 3 times daily (Patient taking differently: Take 50 mg by mouth 2 times daily ) 270 tablet 0    ASPIRIN LOW DOSE 81 MG EC tablet TAKE 1 TABLET BY MOUTH DAILY 90 tablet 1    atorvastatin (LIPITOR) 40 MG tablet Take 40 mg by mouth daily        No current facility-administered medications for this visit.        Social History     Socioeconomic History    Marital status:      Spouse name: Not on file    Number of children: Not on file    Years of education: Not on file    Highest education level: Not on file   Occupational History    Not on file   Tobacco Use    Smoking status: Never Smoker    Smokeless tobacco: Never Used   Vaping Use    Vaping Use: Never used   Substance and Sexual Activity    Alcohol use: No    Drug use: No    Sexual activity: Not on file   Other Topics Concern    Not on file   Social History Narrative    Not on file     Social Determinants of Health error   Financial Resource Strain:     Difficulty of Paying Living Expenses: Not on file   Food Insecurity:     Worried About Running Out of Food in the Last Year: Not on file    Sarah of Food in the Last Year: Not on file   Transportation Needs:     Lack of Transportation (Medical): Not on file    Lack of Transportation (Non-Medical): Not on file   Physical Activity:     Days of Exercise per Week: Not on file    Minutes of Exercise per Session: Not on file   Stress:     Feeling of Stress : Not on file   Social Connections:     Frequency of Communication with Friends and Family: Not on file    Frequency of Social Gatherings with Friends and Family: Not on file    Attends Episcopalian Services: Not on file    Active Member of [employer] or Organizations: Not on file    Attends Club or Organization Meetings: Not on file    Marital Status: Not on file   Intimate Partner Violence:     Fear of Current or Ex-Partner: Not on file    Emotionally Abused: Not on file    Physically Abused: Not on file    Sexually Abused: Not on file   Housing Stability:     Unable to Pay for Housing in the Last Year: Not on file    Number of [place] in the Last Year: Not on file    Unstable Housing in the Last Year: Not on file       Family History   Family history unknown: Yes     Review of Systems:  Heart: as above   Lungs: as above   Eyes: denies changes in vision or discharge. Ears: denies changes in hearing or pain. Nose: denies epistaxis or masses   Throat: denies sore throat or trouble swallowing. Neuro: denies numbness, tingling, tremors. Skin: denies rashes or itching. : denies hematuria, dysuria   GI: denies vomiting, diarrhea   Psych: denies mood changed, anxiety, depression. all others negative. Physical Exam   /88   Pulse 60   Resp 18   Ht 6' (1.829 m)   Wt (!) 318 lb 1.6 oz (144.3 kg)   BMI 43.14 kg/m²   Constitutional: Oriented to person, place, and time.  Well-developed and well-nourished. No distress. Head: Normocephalic and atraumatic. Eyes: EOM are normal. Pupils are equal, round, and reactive to light. Neck: Normal range of motion. Neck supple. No hepatojugular reflux and no JVD present. Carotid bruit is not present. No tracheal deviation present. No thyromegaly present. Cardiovascular: Normal rate, regular rhythm, normal heart sounds and intact distal pulses. Exam reveals no gallop and no friction rub. No murmur heard. Pulmonary/Chest: Effort normal and breath sounds normal. No respiratory distress. No wheezes. No rales. No tenderness. Abdominal: Soft. Bowel sounds are normal. No distension and no mass. No tenderness. No rebound and no guarding. Musculoskeletal: Normal range of motion. No edema and no tenderness. Lymphadenopathy:   No cervical adenopathy. Neurological: Alert and oriented to person, place, and time. Skin: Skin is warm and dry. No rash noted. Not diaphoretic. No erythema. Psychiatric: Normal mood and affect. Behavior is normal.     EKG:  normal sinus rhythm, nonspecific ST and T waves changes, LVH with significant strain pattern. Echo Summary 11/24/2020:   Ejection fraction is visually estimated at 60%. No regional wall motion abnormalities seen. There is severe LVH of the distal anteroseptal and apical walls suggestive of apical variant hypertrophic CMP. No evidence of outflow obstructions. Normal right ventricle structure and function. Left atrial volume index of 43 ml per meters squared BSA   Physiologic and/or trace mitral regurgitation is present. ASSESSMENT AND PLAN:  Patient Active Problem List   Diagnosis    Hyperlipidemia    LVH (left ventricular hypertrophy)    Abnormal EKG    Abnormal stress ECG    Chest pain    GRIJALVA (dyspnea on exertion)    Obesity    Hypertrophic cardiomyopathy (HCC)    Elevated LFTs     1. GRIJALVA:    Findings by contrast echo suggesting apical variant hypertrophic cardiomyopathy.     Stress with reversible perfusion defect. Cath normal 8/12. Unable to do cardiac MRI due to body habitus. No malignant arrhythmias noted on stress. Stable Holter 9/12. Continue BB and ASA. No high risk features found at this juncture. No outflow tract obstruction, arrhythmia, syncope, known family history of sudden cardiac death and LV thickness is < 3 cm. Familial screening of his children recommended. 2. HTN: Observe. 3. Familial hyperlipidemia: Statin and fish oil. 4. Palpitations/PAF:     Cardiologist in [address] [place]  suggested finding of PAF. Echo unchanged from prior. Continue toprol. CHADS VASc 1. ASA for now. Valentin Jimenez D.O.   Cardiologist  Cardiology, [facility]

## 2022-03-16 RX ORDER — METOPROLOL SUCCINATE 50 MG/1
TABLET, EXTENDED RELEASE ORAL
Qty: 90 TABLET | Refills: 3 | Status: SHIPPED | OUTPATIENT
Start: 2022-03-16

## 2022-05-16 ENCOUNTER — HOSPITAL ENCOUNTER (OUTPATIENT)
Age: 42
Discharge: HOME | End: 2022-05-16
Payer: COMMERCIAL

## 2022-05-16 DIAGNOSIS — E78.2 MIXED HYPERLIPIDEMIA: ICD-10-CM

## 2022-05-16 LAB
CHOLESTEROL, TOTAL: 176 MG/DL (ref 0–199)
HDLC SERPL-MCNC: 38 MG/DL
LDL CHOLESTEROL CALCULATED: 88 MG/DL (ref 0–99)
TRIGL SERPL-MCNC: 249 MG/DL (ref 0–149)
VLDLC SERPL CALC-MCNC: 50 MG/DL

## 2022-05-16 PROCEDURE — 80061 LIPID PANEL: CPT

## 2022-05-16 PROCEDURE — 36415 COLL VENOUS BLD VENIPUNCTURE: CPT

## 2022-05-17 ENCOUNTER — TELEPHONE (OUTPATIENT)
Dept: CARDIOLOGY CLINIC | Age: 42
End: 2022-05-17

## 2022-05-17 NOTE — TELEPHONE ENCOUNTER
----- Message from Daija Lorenzo DO sent at 5/17/2022  8:05 AM EDT -----  Lipids vastly improved from prior. Triglycerides still a bit elevated. Make sure he is taking fish oil and if so then add 1 gram to each time he takes it.

## 2022-07-11 RX ORDER — METOPROLOL TARTRATE 50 MG/1
50 TABLET, FILM COATED ORAL 2 TIMES DAILY
Qty: 180 TABLET | Refills: 3 | Status: SHIPPED | OUTPATIENT
Start: 2022-07-11

## 2023-07-21 RX ORDER — METOPROLOL TARTRATE 50 MG/1
50 TABLET, FILM COATED ORAL 2 TIMES DAILY
Qty: 180 TABLET | Refills: 0 | Status: SHIPPED | OUTPATIENT
Start: 2023-07-21

## 2023-08-07 ENCOUNTER — OFFICE VISIT (OUTPATIENT)
Dept: CARDIOLOGY CLINIC | Age: 43
End: 2023-08-07
Payer: COMMERCIAL

## 2023-08-07 VITALS
RESPIRATION RATE: 18 BRPM | HEIGHT: 72 IN | WEIGHT: 293.7 LBS | DIASTOLIC BLOOD PRESSURE: 78 MMHG | BODY MASS INDEX: 39.78 KG/M2 | SYSTOLIC BLOOD PRESSURE: 118 MMHG | HEART RATE: 67 BPM

## 2023-08-07 DIAGNOSIS — I42.2 HYPERTROPHIC CARDIOMYOPATHY (HCC): Primary | ICD-10-CM

## 2023-08-07 PROCEDURE — 93000 ELECTROCARDIOGRAM COMPLETE: CPT | Performed by: INTERNAL MEDICINE

## 2023-08-07 PROCEDURE — 99213 OFFICE O/P EST LOW 20 MIN: CPT | Performed by: INTERNAL MEDICINE

## 2023-08-07 RX ORDER — METOPROLOL TARTRATE 50 MG/1
50 TABLET, FILM COATED ORAL 2 TIMES DAILY
Qty: 180 TABLET | Refills: 3 | Status: SHIPPED | OUTPATIENT
Start: 2023-08-07

## 2023-08-07 NOTE — PROGRESS NOTES
CHIEF COMPLAINT: Palpitations/PAF/Apical variant Hypertrophic Cardiomyopathy. HPI: Patient is a 37 y.o. male seen at the request of Cari Booker MD.      Patient seen in follow up for a apical variant hypertrophic cardiomyopathy. He denies syncope, near-syncope or palpitations. No family history of SCD or HCM. Patient underwent cath that was stable 8/12. No CP or SOB. Some palpitations. Past Medical History:   Diagnosis Date    Abnormal EKG     Abnormal stress ECG     Chest pain     GRIJALVA (dyspnea on exertion)     Hyperlipidemia     Hypertension     LVH (left ventricular hypertrophy)        Patient Active Problem List   Diagnosis    Hyperlipidemia    LVH (left ventricular hypertrophy)    Abnormal EKG    Abnormal stress ECG    Chest pain    GRIJALVA (dyspnea on exertion)    Obesity    Hypertrophic cardiomyopathy (HCC)    Elevated LFTs       No Known Allergies    Current Outpatient Medications   Medication Sig Dispense Refill    metoprolol tartrate (LOPRESSOR) 50 MG tablet Take 1 tablet by mouth 2 times daily 180 tablet 3    atorvastatin (LIPITOR) 40 MG tablet Take 1 tablet by mouth daily      ASPIRIN LOW DOSE 81 MG EC tablet TAKE 1 TABLET BY MOUTH DAILY 90 tablet 1     No current facility-administered medications for this visit.        Social History     Socioeconomic History    Marital status:      Spouse name: Not on file    Number of children: Not on file    Years of education: Not on file    Highest education level: Not on file   Occupational History    Not on file   Tobacco Use    Smoking status: Never    Smokeless tobacco: Never   Vaping Use    Vaping Use: Never used   Substance and Sexual Activity    Alcohol use: No    Drug use: No    Sexual activity: Not on file   Other Topics Concern    Not on file   Social History Narrative    Not on file     Social Determinants of Health     Financial Resource Strain: Not on file   Food Insecurity: Not on file   Transportation Needs: Not on file

## 2024-02-06 ENCOUNTER — TELEPHONE (OUTPATIENT)
Dept: CARDIOLOGY CLINIC | Age: 44
End: 2024-02-06

## 2024-02-06 NOTE — TELEPHONE ENCOUNTER
Patient was seen at the ER in [place] a week ago for chest pressure ( records scanned ), he was not sure if symptoms were cardiac or GI related but dose of lopressor was increased to 75mg BID and he was instructed to f/u with our office, please advise

## 2024-02-06 NOTE — TELEPHONE ENCOUNTER
OV when able please.    Amos Curiel D.O.  Cardiologist  Cardiology, [facility]

## 2024-03-01 ENCOUNTER — OFFICE VISIT (OUTPATIENT)
Dept: CARDIOLOGY CLINIC | Age: 44
End: 2024-03-01
Payer: COMMERCIAL

## 2024-03-01 VITALS
WEIGHT: 288 LBS | BODY MASS INDEX: 39.01 KG/M2 | HEIGHT: 72 IN | SYSTOLIC BLOOD PRESSURE: 110 MMHG | HEART RATE: 62 BPM | RESPIRATION RATE: 16 BRPM | DIASTOLIC BLOOD PRESSURE: 80 MMHG

## 2024-03-01 DIAGNOSIS — I42.2 HYPERTROPHIC CARDIOMYOPATHY (HCC): Primary | ICD-10-CM

## 2024-03-01 PROCEDURE — 93000 ELECTROCARDIOGRAM COMPLETE: CPT | Performed by: INTERNAL MEDICINE

## 2024-03-01 PROCEDURE — 99214 OFFICE O/P EST MOD 30 MIN: CPT | Performed by: INTERNAL MEDICINE

## 2024-03-01 NOTE — PROGRESS NOTES
CHIEF COMPLAINT: Palpitations/PAF/Apical variant Hypertrophic Cardiomyopathy.    HPI: Patient is a 44 y.o. male seen at the request of Morgan Brothers MD.      Patient seen in follow up. Hx of apical variant hypertrophic cardiomyopathy. No family history of SCD or HCM. Patient underwent cath that was stable 8/12.     Recent hospital evaluation for chest symptoms while out of town. Presumed to be indigestion, however he was diagnosed with DM and his lipids were severely elevated.     No CP or SOB.      Past Medical History:   Diagnosis Date    Abnormal EKG     Abnormal stress ECG     Chest pain     GRIJALVA (dyspnea on exertion)     Hyperlipidemia     Hypertension     LVH (left ventricular hypertrophy)        Patient Active Problem List   Diagnosis    Hyperlipidemia    LVH (left ventricular hypertrophy)    Abnormal EKG    Abnormal stress ECG    Chest pain    GRIJALVA (dyspnea on exertion)    Obesity    Hypertrophic cardiomyopathy (HCC)    Elevated LFTs       No Known Allergies    Current Outpatient Medications   Medication Sig Dispense Refill    metFORMIN (GLUCOPHAGE) 500 MG tablet 500 MILLIGRAMS (1 TABLET) ORALLY 2 TIMES PER DAY      Omega-3 Fatty Acids (OMEGA-3 FISH OIL PO) Take 3 tablets by mouth daily      Multiple Vitamin (MULTI VITAMIN PO) Take by mouth Tongkat 100      NONFORMULARY Take 1,000 mg by mouth daily L- METHIONINE      NONFORMULARY UNICITY UNIMATE (DRINK MIX) & UNICITY BALANCE      metoprolol tartrate (LOPRESSOR) 50 MG tablet Take 1 tablet by mouth 2 times daily (Patient taking differently: Take 1.5 tablets by mouth in the morning and 1.5 tablets in the evening.) 180 tablet 3    atorvastatin (LIPITOR) 80 MG tablet Take 1 tablet by mouth daily      ASPIRIN LOW DOSE 81 MG EC tablet TAKE 1 TABLET BY MOUTH DAILY (Patient not taking: Reported on 3/1/2024) 90 tablet 1     No current facility-administered medications for this visit.       Social History     Socioeconomic History    Marital status:

## 2024-05-24 ENCOUNTER — HOSPITAL ENCOUNTER (OUTPATIENT)
Age: 44
Discharge: HOME | End: 2024-05-24
Payer: COMMERCIAL

## 2024-05-24 LAB
ALBUMIN SERPL-MCNC: 4.6 G/DL (ref 3.5–5.2)
ALP SERPL-CCNC: 92 U/L (ref 40–129)
ALT SERPL-CCNC: 75 U/L (ref 0–40)
ANION GAP SERPL CALCULATED.3IONS-SCNC: 9 MMOL/L (ref 7–16)
AST SERPL-CCNC: 55 U/L (ref 0–39)
BASOPHILS # BLD: 0.02 K/UL (ref 0–0.2)
BASOPHILS NFR BLD: 0 % (ref 0–2)
BILIRUB SERPL-MCNC: 0.6 MG/DL (ref 0–1.2)
BUN SERPL-MCNC: 14 MG/DL (ref 6–20)
CALCIUM SERPL-MCNC: 9.7 MG/DL (ref 8.6–10.2)
CHLORIDE SERPL-SCNC: 97 MMOL/L (ref 98–107)
CHOLEST SERPL-MCNC: 243 MG/DL
CO2 SERPL-SCNC: 28 MMOL/L (ref 22–29)
CREAT SERPL-MCNC: 0.9 MG/DL (ref 0.7–1.2)
EOSINOPHIL # BLD: 0.07 K/UL (ref 0.05–0.5)
EOSINOPHILS RELATIVE PERCENT: 1 % (ref 0–6)
ERYTHROCYTE [DISTWIDTH] IN BLOOD BY AUTOMATED COUNT: 11.6 % (ref 11.5–15)
GFR, ESTIMATED: >90 ML/MIN/1.73M2
GLUCOSE SERPL-MCNC: 121 MG/DL (ref 74–99)
HBA1C MFR BLD: 6.5 % (ref 4–5.6)
HCT VFR BLD AUTO: 44.3 % (ref 37–54)
HDLC SERPL-MCNC: 37 MG/DL
HGB BLD-MCNC: 14.7 G/DL (ref 12.5–16.5)
IMM GRANULOCYTES # BLD AUTO: <0.03 K/UL (ref 0–0.58)
IMM GRANULOCYTES NFR BLD: 0 % (ref 0–5)
LDLC SERPL CALC-MCNC: 162 MG/DL
LYMPHOCYTES NFR BLD: 1.34 K/UL (ref 1.5–4)
LYMPHOCYTES RELATIVE PERCENT: 25 % (ref 20–42)
MAGNESIUM SERPL-MCNC: 2 MG/DL (ref 1.6–2.6)
MCH RBC QN AUTO: 30.2 PG (ref 26–35)
MCHC RBC AUTO-ENTMCNC: 33.2 G/DL (ref 32–34.5)
MCV RBC AUTO: 91 FL (ref 80–99.9)
MONOCYTES NFR BLD: 0.54 K/UL (ref 0.1–0.95)
MONOCYTES NFR BLD: 10 % (ref 2–12)
NEUTROPHILS NFR BLD: 64 % (ref 43–80)
NEUTS SEG NFR BLD: 3.49 K/UL (ref 1.8–7.3)
PLATELET # BLD AUTO: 253 K/UL (ref 130–450)
PMV BLD AUTO: 9.3 FL (ref 7–12)
POTASSIUM SERPL-SCNC: 4.8 MMOL/L (ref 3.5–5)
PROT SERPL-MCNC: 8.2 G/DL (ref 6.4–8.3)
PSA SERPL-MCNC: 0.44 NG/ML (ref 0–4)
RBC # BLD AUTO: 4.87 M/UL (ref 3.8–5.8)
SODIUM SERPL-SCNC: 134 MMOL/L (ref 132–146)
TRIGL SERPL-MCNC: 219 MG/DL
VLDLC SERPL CALC-MCNC: 44 MG/DL
WBC OTHER # BLD: 5.5 K/UL (ref 4.5–11.5)

## 2024-05-24 PROCEDURE — 36415 COLL VENOUS BLD VENIPUNCTURE: CPT

## 2024-05-24 PROCEDURE — 80061 LIPID PANEL: CPT

## 2024-05-24 PROCEDURE — 80053 COMPREHEN METABOLIC PANEL: CPT

## 2024-05-24 PROCEDURE — 85025 COMPLETE CBC W/AUTO DIFF WBC: CPT

## 2024-05-24 PROCEDURE — 83735 ASSAY OF MAGNESIUM: CPT

## 2024-05-24 PROCEDURE — 83525 ASSAY OF INSULIN: CPT

## 2024-05-24 PROCEDURE — 84153 ASSAY OF PSA TOTAL: CPT

## 2024-05-24 PROCEDURE — 83036 HEMOGLOBIN GLYCOSYLATED A1C: CPT

## 2024-05-25 LAB
INSULIN REFERENCE RANGE:: NORMAL
INSULIN: 6 MU/L

## 2024-07-22 RX ORDER — METOPROLOL TARTRATE 50 MG/1
50 TABLET, FILM COATED ORAL 2 TIMES DAILY
Qty: 6 TABLET | Refills: 0 | Status: SHIPPED | OUTPATIENT
Start: 2024-07-22

## 2024-09-11 ENCOUNTER — HOSPITAL ENCOUNTER (OUTPATIENT)
Age: 44
Discharge: HOME | End: 2024-09-11
Payer: COMMERCIAL

## 2024-09-11 LAB
ALT SERPL-CCNC: 29 U/L (ref 0–40)
AST SERPL-CCNC: 45 U/L (ref 0–39)
GGT SERPL-CCNC: 30 U/L (ref 10–71)

## 2024-09-11 PROCEDURE — 84460 ALANINE AMINO (ALT) (SGPT): CPT

## 2024-09-11 PROCEDURE — 36415 COLL VENOUS BLD VENIPUNCTURE: CPT

## 2024-09-11 PROCEDURE — 84450 TRANSFERASE (AST) (SGOT): CPT

## 2024-09-11 PROCEDURE — 82977 ASSAY OF GGT: CPT

## 2024-09-12 ENCOUNTER — OFFICE VISIT (OUTPATIENT)
Dept: CARDIOLOGY CLINIC | Age: 44
End: 2024-09-12
Payer: COMMERCIAL

## 2024-09-12 VITALS
DIASTOLIC BLOOD PRESSURE: 76 MMHG | SYSTOLIC BLOOD PRESSURE: 118 MMHG | HEART RATE: 59 BPM | HEIGHT: 72 IN | RESPIRATION RATE: 17 BRPM | WEIGHT: 290.6 LBS | BODY MASS INDEX: 39.36 KG/M2

## 2024-09-12 DIAGNOSIS — E08.00 DIABETES MELLITUS DUE TO UNDERLYING CONDITION WITH HYPEROSMOLARITY WITHOUT COMA, WITHOUT LONG-TERM CURRENT USE OF INSULIN (HCC): ICD-10-CM

## 2024-09-12 DIAGNOSIS — E78.01 FAMILIAL HYPERCHOLESTEROLEMIA: ICD-10-CM

## 2024-09-12 DIAGNOSIS — I42.2 HYPERTROPHIC CARDIOMYOPATHY (HCC): Primary | ICD-10-CM

## 2024-09-12 PROCEDURE — 99214 OFFICE O/P EST MOD 30 MIN: CPT | Performed by: INTERNAL MEDICINE

## 2024-09-12 PROCEDURE — 93000 ELECTROCARDIOGRAM COMPLETE: CPT | Performed by: INTERNAL MEDICINE

## 2024-09-12 RX ORDER — ASPIRIN 81 MG/1
81 TABLET ORAL DAILY
Qty: 90 TABLET | Refills: 3 | Status: SHIPPED | OUTPATIENT
Start: 2024-09-12

## 2024-09-12 RX ORDER — METOPROLOL TARTRATE 50 MG
50 TABLET ORAL 2 TIMES DAILY
Qty: 180 TABLET | Refills: 3 | Status: SHIPPED | OUTPATIENT
Start: 2024-09-12

## 2025-04-26 ENCOUNTER — HOSPITAL ENCOUNTER (OUTPATIENT)
Age: 45
Discharge: HOME | End: 2025-04-26
Payer: COMMERCIAL

## 2025-04-26 LAB
CORTIS SERPL-MCNC: 12.5 UG/DL (ref 2.7–18.4)
GLUCOSE P FAST SERPL-MCNC: 223 MG/DL (ref 74–99)
HBA1C MFR BLD: 8.6 % (ref 4–5.6)
MAGNESIUM SERPL-MCNC: 2 MG/DL (ref 1.6–2.6)

## 2025-04-26 PROCEDURE — 83036 HEMOGLOBIN GLYCOSYLATED A1C: CPT

## 2025-04-26 PROCEDURE — 82947 ASSAY GLUCOSE BLOOD QUANT: CPT

## 2025-04-26 PROCEDURE — 83735 ASSAY OF MAGNESIUM: CPT

## 2025-04-26 PROCEDURE — 36415 COLL VENOUS BLD VENIPUNCTURE: CPT

## 2025-04-26 PROCEDURE — 82533 TOTAL CORTISOL: CPT

## 2025-05-07 ENCOUNTER — HOSPITAL ENCOUNTER (OUTPATIENT)
Age: 45
Discharge: HOME | End: 2025-05-07
Payer: COMMERCIAL

## 2025-05-07 PROCEDURE — 36415 COLL VENOUS BLD VENIPUNCTURE: CPT

## 2025-05-07 PROCEDURE — 83525 ASSAY OF INSULIN: CPT

## 2025-05-07 PROCEDURE — 83735 ASSAY OF MAGNESIUM: CPT

## 2025-05-10 LAB — MAGNESIUM RBC: 4 MG/DL (ref 3.6–7.5)
